# Patient Record
Sex: MALE | Race: WHITE | NOT HISPANIC OR LATINO | Employment: OTHER | URBAN - METROPOLITAN AREA
[De-identification: names, ages, dates, MRNs, and addresses within clinical notes are randomized per-mention and may not be internally consistent; named-entity substitution may affect disease eponyms.]

---

## 2021-01-11 DIAGNOSIS — Z23 NEED FOR VACCINATION: ICD-10-CM

## 2021-08-23 ENCOUNTER — PATIENT MESSAGE (OUTPATIENT)
Dept: HEALTH INFORMATION MANAGEMENT | Facility: OTHER | Age: 76
End: 2021-08-23

## 2021-11-17 ENCOUNTER — TELEPHONE (OUTPATIENT)
Dept: HEALTH INFORMATION MANAGEMENT | Facility: OTHER | Age: 76
End: 2021-11-17

## 2021-11-17 NOTE — TELEPHONE ENCOUNTER
Outcome: Left Message to schedule comprehensive health assessment.     Please transfer to Patient Outreach Team at [phone] when patient returns call.      [facility] Verified: yes    Attempt # 2

## 2022-05-16 ENCOUNTER — PATIENT MESSAGE (OUTPATIENT)
Dept: HEALTH INFORMATION MANAGEMENT | Facility: OTHER | Age: 77
End: 2022-05-16

## 2022-07-13 ENCOUNTER — TELEPHONE (OUTPATIENT)
Dept: HEALTH INFORMATION MANAGEMENT | Facility: OTHER | Age: 77
End: 2022-07-13
Payer: MEDICARE

## 2022-07-20 PROBLEM — Z87.39 HISTORY OF GOUT: Status: ACTIVE | Noted: 2022-07-20

## 2022-07-20 PROBLEM — R97.20 ELEVATED PSA: Status: ACTIVE | Noted: 2022-07-20

## 2022-07-20 PROBLEM — E55.9 VITAMIN D INSUFFICIENCY: Status: ACTIVE | Noted: 2022-07-20

## 2022-07-20 PROBLEM — E78.5 DYSLIPIDEMIA: Status: ACTIVE | Noted: 2022-07-20

## 2022-07-20 PROBLEM — Z87.442 HISTORY OF KIDNEY STONES: Status: ACTIVE | Noted: 2022-07-20

## 2022-07-20 PROBLEM — N40.0 BENIGN PROSTATE HYPERPLASIA: Status: ACTIVE | Noted: 2022-07-20

## 2022-07-20 PROBLEM — R73.09 ELEVATED GLYCOHEMOGLOBIN: Status: ACTIVE | Noted: 2022-07-20

## 2022-09-16 ENCOUNTER — OFFICE VISIT (OUTPATIENT)
Dept: MEDICAL GROUP | Facility: PHYSICIAN GROUP | Age: 77
End: 2022-09-16
Payer: MEDICARE

## 2022-09-16 VITALS
HEART RATE: 60 BPM | SYSTOLIC BLOOD PRESSURE: 106 MMHG | OXYGEN SATURATION: 94 % | DIASTOLIC BLOOD PRESSURE: 64 MMHG | HEIGHT: 71 IN | TEMPERATURE: 96.8 F | WEIGHT: 179 LBS | RESPIRATION RATE: 14 BRPM | BODY MASS INDEX: 25.06 KG/M2

## 2022-09-16 DIAGNOSIS — T78.40XD ALLERGY, SUBSEQUENT ENCOUNTER: ICD-10-CM

## 2022-09-16 DIAGNOSIS — N40.0 BENIGN PROSTATIC HYPERPLASIA WITHOUT LOWER URINARY TRACT SYMPTOMS: ICD-10-CM

## 2022-09-16 DIAGNOSIS — Z87.442 HISTORY OF KIDNEY STONES: ICD-10-CM

## 2022-09-16 DIAGNOSIS — R73.03 PRE-DIABETES: ICD-10-CM

## 2022-09-16 DIAGNOSIS — E55.9 VITAMIN D INSUFFICIENCY: ICD-10-CM

## 2022-09-16 DIAGNOSIS — M65.351 TRIGGER LITTLE FINGER OF RIGHT HAND: ICD-10-CM

## 2022-09-16 DIAGNOSIS — E78.5 DYSLIPIDEMIA: ICD-10-CM

## 2022-09-16 PROBLEM — T78.40XA ALLERGIES: Status: ACTIVE | Noted: 2022-09-16

## 2022-09-16 PROCEDURE — 99203 OFFICE O/P NEW LOW 30 MIN: CPT | Performed by: FAMILY MEDICINE

## 2022-09-16 ASSESSMENT — PATIENT HEALTH QUESTIONNAIRE - PHQ9: CLINICAL INTERPRETATION OF PHQ2 SCORE: 0

## 2022-09-16 NOTE — LETTER
[facility]  RUDY Brantley.  [address]  [place] [address]  Fax: [phone]   Authorization for Release/Disclosure of   Protected Health Information   Name: AZIZA SHEEHAN : 1945 SSN: [id number]   Address: [address] [place] [address] Phone:    There are no phone numbers on file.   I authorize the entity listed below to release/disclose the PHI below to:   [facility]/CATIA Brantley and CATIA Brantley   Provider or Entity Name:  [facility]    Address   City, State, Zip   Phone:      Fax:     Reason for request: continuity of care   Information to be released:    [  ] LAST COLONOSCOPY,  including any PATH REPORT and follow-up  [  ] LAST FIT/COLOGUARD RESULT [  ] LAST DEXA  [  ] LAST MAMMOGRAM  [  ] LAST PAP  [  ] LAST LABS [  ] RETINA EXAM REPORT  [  ] IMMUNIZATION RECORDS  [ x ] Release all info      [  ] Check here and initial the line next to each item to release ALL health information INCLUDING  _____ Care and treatment for drug and / or alcohol abuse  _____ HIV testing, infection status, or AIDS  _____ Genetic Testing    DATES OF SERVICE OR TIME PERIOD TO BE DISCLOSED: _____________  I understand and acknowledge that:  * This Authorization may be revoked at any time by you in writing, except if your health information has already been used or disclosed.  * Your health information that will be used or disclosed as a result of you signing this authorization could be re-disclosed by the recipient. If this occurs, your re-disclosed health information may no longer be protected by State or Federal laws.  * You may refuse to sign this Authorization. Your refusal will not affect your ability to obtain treatment.  * This Authorization becomes effective upon signing and will  on (date) __________.      If no date is indicated, this Authorization will  one (1) year from the signature date.    Name: Aziza GOODRICH  Cb    Signature:   Date:     9/16/2022       PLEASE FAX REQUESTED RECORDS BACK TO: [phone]   No Vaccines Administered.

## 2022-09-16 NOTE — ASSESSMENT & PLAN NOTE
Chronic, recent lipid panel in June 2022 below.  States he has had high LDL and mildly low HDL for many years.  Discussed getting a cardiac calcium score test done to assess the amount of plaque buildup in his coronary arteries to help determine if he would like to take statins.     CHOLESTEROL 100 - 200 mg/dL 179    TRIGLYCERIDE 0 - 150 mg/dL 86    HDL 45 - 100 mg/dL 42 Low     LDL CALCULATED See Comment mg/dL 120 High     NON-HDL CHOLESTEROL See Comment mg/dL 137 High

## 2022-09-16 NOTE — ASSESSMENT & PLAN NOTE
Per last lab review, D3 at 27.  Dates that in his citrus calcium there is some vitamin D, unsure of the dose.  Recommend 1000 units of vitamin D3 daily so to supplements if there is not 1000 units in his other supplement.

## 2022-09-16 NOTE — ASSESSMENT & PLAN NOTE
Chronic, diagnosed about 10 years ago.  Denies any urinary changes at this time, sometimes has occasional weak stream.  No history of prostate cancer or family history of prostate cancer.  He is followed by urology, Dr. Marion.  Most recent PSA at 8.46, states that he has fluctuated between a 6 and 8 range over the years.  Continues on tamsulosin 0.4 mg daily.

## 2022-09-16 NOTE — ASSESSMENT & PLAN NOTE
Seasonal hayfever, mild here. [place] Takes Aller-clear daily.

## 2022-09-16 NOTE — ASSESSMENT & PLAN NOTE
Chronic, R 5th digit, no longer getting injections he has intermittent catching however not bothersome at this time..

## 2022-09-16 NOTE — ASSESSMENT & PLAN NOTE
Chronic, hx of 2-3 incidnence of kidney stones in the last 10 years.  Followed by Dr. Marion, Urology.

## 2022-09-16 NOTE — PROGRESS NOTES
Subjective:     CC:   Chief Complaint   Patient presents with    Establish Care     New Pt        HISTORY OF THE PRESENT ILLNESS: Patient is a 77 y.o. male. This pleasant patient is here today to establish care and discuss . His prior PCP was Dr. Monet in [place].   Lives in [place] with his wife, retired  and states he has 7 children that live in various states.  He is quite active and likes to bike as well as walk for activity.    History of kidney stones  Chronic, hx of 2-3 incidnence of kidney stones in the last 10 years.  Followed by Dr. Marion, Urology.     Trigger finger  Chronic, R 5th digit, no longer getting injections he has intermittent catching however not bothersome at this time..    Pre-diabetes  Chronic, intermittently elevated over the years.  States he likes breads and cereals but states his wife overall watches their diet and eats healthy overall and is very active.  Bikes 11 miles 3 times weekly and also walks and does other various activities for exercise.  Discussed cutting back on his portions of carbohydrates and cutting out sugary drinks if drinking out of his diet.  We will continue to monitor A1c every 6 months.    Allergies  Seasonal hayfever, mild here. [place] Takes Aller-clear daily.    Dyslipidemia  Chronic, recent lipid panel in June 2022 below.  States he has had high LDL and mildly low HDL for many years.  Discussed getting a cardiac calcium score test done to assess the amount of plaque buildup in his coronary arteries to help determine if he would like to take statins.     CHOLESTEROL 100 - 200 mg/dL 179    TRIGLYCERIDE 0 - 150 mg/dL 86    HDL 45 - 100 mg/dL 42 Low     LDL CALCULATED See Comment mg/dL 120 High     NON-HDL CHOLESTEROL See Comment mg/dL 137 High          Benign prostatic hyperplasia without lower urinary tract symptoms  Chronic, diagnosed about 10 years ago.  Denies any urinary changes at this  "time, sometimes has occasional weak stream.  No history of prostate cancer or family history of prostate cancer.  He is followed by urology, Dr. Marion.  Most recent PSA at 8.46, states that he has fluctuated between a 6 and 8 range over the years.  Continues on tamsulosin 0.4 mg daily.    Vitamin D insufficiency  Per last lab review, D3 at 27.  Dates that in his citrus calcium there is some vitamin D, unsure of the dose.  Recommend 1000 units of vitamin D3 daily so to supplements if there is not 1000 units in his other supplement.      Current Outpatient Medications Ordered in Epic   Medication Sig Dispense Refill    tamsulosin (FLOMAX) 0.4 MG capsule Take 0.4 mg by mouth every day.      Calcium Citrate (CITRUS CALCIUM PO) Take 1 Tablet by mouth every day. 1000 mg      allopurinol (ZYLOPRIM) 300 MG TABS Take 300 mg by mouth every evening.      Omega-3 Fatty Acids (FISH OIL PO) Take 1,400 mg by mouth every day.      Non Formulary Request Aller-Clear 10mg PO daily   Indications: Disorder due to an Allergy       No current Epic-ordered facility-administered medications on file.       Health Maintenance: Completed      Objective:       Exam: /64 (BP Location: Right arm, Patient Position: Sitting, BP Cuff Size: Adult)   Pulse 60   Temp 36 °C (96.8 °F) (Temporal)   Resp 14   Ht 1.791 m (5' 10.51\")   Wt 81.2 kg (179 lb)   SpO2 94%  Body mass index is 25.31 kg/m².    Physical Exam  Vitals reviewed.   Constitutional:       General: He is not in acute distress.     Appearance: Normal appearance.   Eyes:      Conjunctiva/sclera: Conjunctivae normal.      Pupils: Pupils are equal, round, and reactive to light.   Cardiovascular:      Rate and Rhythm: Normal rate and regular rhythm.      Pulses: Normal pulses.      Heart sounds: Normal heart sounds. No murmur heard.  Pulmonary:      Effort: Pulmonary effort is normal. No respiratory distress.      Breath sounds: Normal breath sounds. No stridor. No wheezing, rhonchi " or rales.   Chest:      Chest wall: No tenderness.   Abdominal:      General: Abdomen is flat. Bowel sounds are normal.   Musculoskeletal:      Right lower leg: No edema.      Left lower leg: No edema.   Skin:     General: Skin is warm.   Neurological:      Mental Status: He is alert and oriented to person, place, and time.   Psychiatric:         Mood and Affect: Mood normal.         Behavior: Behavior normal.        A chaperone was offered to the patient during today's exam. Patient declined chaperone.        Assessment & Plan:   77 y.o. male with the following -    1. Allergy, subsequent encounter  Chronic, stable on current over-the-counter antihistamine.  2. Trigger little finger of right hand  Chronic, stable.  3. Vitamin D insufficiency  Chronic, advised taking at least 2000 units of vitamin D3 daily.  4. History of kidney stones  Chronic, history of, is hesitant to take too much vitamin D as he is worried he may get increased incidence of kidney stones..  Is followed by urology.  5. Pre-diabetes  Chronic, discussed dietary and lifestyle measures to help prevent prediabetes and progression to diabetes.  We will recheck in December.  - Comp Metabolic Panel; Future  - HEMOGLOBIN A1C; Future    6. Dyslipidemia  Chronic, currently not on any medication management.  Discussed cardiac calcium scoring to help assess the extent if any of plaque in the arteries and can discuss medication management thereafter.  - CT-CARDIAC SCORING; Future    7. Benign prostatic hyperplasia without lower urinary tract symptoms   Chronic, stable and followed by urology.    I spent a total of  43 minutes with record review, exam, communication with the patient, communication with other providers, and documentation of this encounter.    No follow-ups on file.    Please note that this dictation was created using voice recognition software. I have made every reasonable attempt to correct obvious errors, but I expect that there are errors of  grammar and possibly content that I did not discover before finalizing the note.

## 2022-09-16 NOTE — ASSESSMENT & PLAN NOTE
Chronic, intermittently elevated over the years.  States he likes breads and cereals but states his wife overall watches their diet and eats healthy overall and is very active.  Bikes 11 miles 3 times weekly and also walks and does other various activities for exercise.  Discussed cutting back on his portions of carbohydrates and cutting out sugary drinks if drinking out of his diet.  We will continue to monitor A1c every 6 months.

## 2023-08-02 ENCOUNTER — TELEPHONE (OUTPATIENT)
Dept: HEALTH INFORMATION MANAGEMENT | Facility: OTHER | Age: 78
End: 2023-08-02
Payer: MEDICARE

## 2024-07-01 NOTE — LETTER
[address] [facility]  [address]  [place] [address]  [phone]    Re: Unable to Reach   7/1/2024       Dear Diogo,    I am a [facility]  and wanted to be certain you had information to contact me should you desire assistance with or have questions about non-medical aspects of your care such as [but not limited to] medical insurance, housing, transportation, material needs, or emergency needs.  If I do not have an answer I will assist you in finding the appropriate agency or individual who can help.      Please feel free to contact me at [phone]. Thank You.    Sincerely,         EMILY Talley      [facility]  RUDY Brantley.  [address]  [place] [address]  Fax: [phone]   Authorization for Release/Disclosure of   Protected Health Information   Name: AZIZA SHEEHAN : 1945 SSN: [id number]   Address: [facility] [address] Phone:    There are no phone numbers on file.   I authorize the entity listed below to release/disclose the PHI below to:   [facility]/CATIA Brantley and CATIA Brantley   Provider or Entity Name:  [facility] Dr.Tim Monet    Address   City, [facility]   Phone:      Fax:     Reason for request: continuity of care   Information to be released:    [  ] LAST COLONOSCOPY,  including any PATH REPORT and follow-up  [  ] LAST FIT/COLOGUARD RESULT [  ] LAST DEXA  [  ] LAST MAMMOGRAM  [  ] LAST PAP  [  ] LAST LABS [  ] RETINA EXAM REPORT  [  ] IMMUNIZATION RECORDS  [  x] Release all info      [  ] Check here and initial the line next to each item to release ALL health information INCLUDING  _____ Care and treatment for drug and / or alcohol abuse  _____ HIV testing, infection status, or AIDS  _____ Genetic Testing    DATES OF SERVICE OR TIME PERIOD TO BE DISCLOSED: _____________  I understand and acknowledge that:  * This Authorization may be revoked at any time by you in writing, except if your health information has already been used or disclosed.  * Your health information that will be used or disclosed as a result of you signing this authorization could be re-disclosed by the recipient. If this occurs, your re-disclosed health information may no longer be protected by State or Federal laws.  * You may refuse to sign this Authorization. Your refusal will not affect your ability to obtain treatment.  * This Authorization becomes effective upon signing and will  on (date) __________.      If no date is indicated, this Authorization will  one (1) year from the signature date.    Name: Aziza  KP Vivar    Signature:   Date:     9/16/2022       PLEASE FAX REQUESTED RECORDS BACK TO: [phone]

## 2024-09-04 ENCOUNTER — PATIENT MESSAGE (OUTPATIENT)
Dept: HEALTH INFORMATION MANAGEMENT | Facility: OTHER | Age: 79
End: 2024-09-04